# Patient Record
Sex: MALE | Race: WHITE | NOT HISPANIC OR LATINO | Employment: OTHER | URBAN - METROPOLITAN AREA
[De-identification: names, ages, dates, MRNs, and addresses within clinical notes are randomized per-mention and may not be internally consistent; named-entity substitution may affect disease eponyms.]

---

## 2020-04-30 ENCOUNTER — HOSPITAL ENCOUNTER (OUTPATIENT)
Dept: OTHER | Facility: HOSPITAL | Age: 75
Discharge: HOME | End: 2020-04-30
Attending: PHYSICIAN ASSISTANT

## 2020-06-26 ENCOUNTER — TELEPHONE (OUTPATIENT)
Dept: ORTHOPEDIC SURGERY | Facility: CLINIC | Age: 75
End: 2020-06-26

## 2020-06-26 NOTE — TELEPHONE ENCOUNTER
LEFT VOICEMAIL FOR PATIENT THAT HIS APPOINTMENT HAS BEEN RESCHEDULED FROM 7/27/20 @ 2:30 PM TO 7/20/20 @ 2:30 PM DUE TO PROVIDER SCHEDULE CHANGE

## 2020-08-06 ENCOUNTER — OFFICE VISIT (OUTPATIENT)
Dept: ORTHOPEDIC SURGERY | Facility: CLINIC | Age: 75
End: 2020-08-06

## 2020-08-06 VITALS — HEIGHT: 72 IN | TEMPERATURE: 97.7 F | WEIGHT: 229 LBS | BODY MASS INDEX: 31.02 KG/M2

## 2020-08-06 DIAGNOSIS — M75.101 TEAR OF RIGHT ROTATOR CUFF, UNSPECIFIED TEAR EXTENT, UNSPECIFIED WHETHER TRAUMATIC: Primary | ICD-10-CM

## 2020-08-06 PROCEDURE — 99204 OFFICE O/P NEW MOD 45 MIN: CPT | Performed by: ORTHOPAEDIC SURGERY

## 2020-08-06 RX ORDER — ATORVASTATIN CALCIUM 10 MG/1
10 TABLET, FILM COATED ORAL DAILY
COMMUNITY

## 2020-08-06 RX ORDER — IBUPROFEN 200 MG
200 TABLET ORAL NIGHTLY
COMMUNITY

## 2020-08-06 NOTE — PROGRESS NOTES
NEW VISIT    Patient: Samm Valentine  ?  YOB: 1945    MRN: [id number]  ?  Chief Complaint   Patient presents with   • Right Shoulder - Pain      ?  HPI:   Samm Manning presents with complaints of right shoulder pain and discomfort.  He states that he was involved with an overhead type injury.  He finds it weak in abduction.  He finds it difficult to reach into the forward position.  He states that any reaching above his head is associated with pain and discomfort.  He also has some pain with cross body activity.  The pain is worse at night.  He has been through physical therapy and that seems to have helped him tremendously for the better.  He states that he has been able to play golf at this point and has been thinking about surgically getting his rotator cuff tear repaired.  The patient was told by his primary care physician that he did have a rotator cuff tear.      Pain Location: RIGHT shoulder  Radiation: none  Quality: dull, aching, sharp, stabbing  Intensity/Severity: mild-moderate  Duration: 1 year  Onset quality: sudden  Timing: intermittent  Aggravating Factors: overhead reaching, reaching forward  Alleviating Factors: NSAIDs, ice  Previous Episodes: no  Associated Symptoms: pain  ADLs Affected: recreational activities/sports  Previous Treatment: PT to shoulder    This patient is a new patient.  This problem is new to this examiner.      Allergies: No Known Allergies    Medications:   Home Medications:  Current Outpatient Medications on File Prior to Visit   Medication Sig   • atorvastatin (LIPITOR) 10 MG tablet Take 10 mg by mouth Daily.   • ibuprofen (ADVIL,MOTRIN) 200 MG tablet Take 200 mg by mouth Every Night.   • psyllium (METAMUCIL) 58.6 % packet Take 1 packet by mouth Daily.     No current facility-administered medications on file prior to visit.      Current Medications:  Scheduled Meds:  PRN Meds:.    I have reviewed the patient's medical history in detail and updated the  "computerized patient record.  Review and summarization of old records include:    Past Medical History:   Diagnosis Date   • Anxiety    • Depression    • Difficulty sleeping    • High cholesterol    • Numbness and tingling    • Stiffness in joint      Past Surgical History:   Procedure Laterality Date   • HERNIA REPAIR      HERNIA REPAIR  X 3     Social History     Occupational History   • Not on file   Tobacco Use   • Smoking status: Never Smoker   Substance and Sexual Activity   • Alcohol use: Yes     Frequency: 4 or more times a week   • Drug use: Not on file   • Sexual activity: Not on file      History reviewed. No pertinent family history.      Review of Systems   Constitutional: Negative.    HENT: Negative.    Eyes: Negative.    Respiratory: Negative.    Cardiovascular: Negative.    Gastrointestinal: Negative.    Endocrine: Negative.    Genitourinary: Negative.    Musculoskeletal: Positive for arthralgias.   Skin: Negative.    Allergic/Immunologic: Negative.    Neurological: Negative.    Hematological: Negative.    Psychiatric/Behavioral: Negative.           Wt Readings from Last 3 Encounters:   08/06/20 104 kg (229 lb)     Ht Readings from Last 3 Encounters:   08/06/20 181.6 cm (71.5\")     Body mass index is 31.49 kg/m².  Facility age limit for growth percentiles is 20 years.  Vitals:    08/06/20 1448   Temp: 97.7 °F (36.5 °C)         Physical Exam  Constitutional: Patient is oriented to person, place, and time. Appears well-developed and well-nourished.   HENT:   Head: Normocephalic and atraumatic.   Eyes: Conjunctivae and EOM are normal. Pupils are equal, round, and reactive to light.   Cardiovascular: Normal rate, regular rhythm, normal heart sounds and intact distal pulses.   Pulmonary/Chest: Effort normal and breath sounds normal.   Musculoskeletal:   See detailed exam below   Neurological: Alert and oriented to person, place, and time. No sensory deficit. Coordination normal.   Skin: Skin is warm and " dry. Capillary refill takes less than 2 seconds. No rash noted. No erythema.   Psychiatric: Patient has a normal mood and affect. His behavior is normal. Judgment and thought content normal.   Nursing note and vitals reviewed.      Ortho Exam:   Right shoulder (rct). The shoulder is extremely tender anteriorly. There is tenderness over the insertion of the rotator cuff over the greater tuberosity of the humerus. Slight proximal migration of the humeral head is noted. AC joint is tender. Crossover adduction test is positive. Drop arm sign is positive. Forward flexion is 0-100 degrees, abduction is 0-110 degrees, external rotation is 0-30 degrees. Patient has mild atrophy both of the supra and infraspinatus fossa. Sulcus sign is negative. There is no evidence of multidirectional instability. Neer test is positive on compression. Skin and soft tissue tenderness is noted. Patient's strength in abduction and external rotation are extremely lacking. The pain level is 5.        Diagnostics:  Right shoulder xrays ap/lateral views were ordered by David Zaldivar MD and performed at [facility]. These images were independently viewed and interpreted by myself, my impression as follows:  MRI reports are available in the office today.  These images are discussed with the patient.  The patient has some blunting of his glenoid labrum with some degenerative change.  There is a complete tear of the supraspinatus with 2.8 cm of tendon retraction.  Minimal atrophy is noted.  There is mild amounts of subacromial bursitis.  My recommendation is to proceed with shoulder arthroscopy and mini open repair of the rotator cuff are based on the findings of the MRI with clinical correlation.  Reviewed MRI report of right shoulder, summary of impression below:  right Shoulder X-Ray  Indication: Pain and limited ROM  AP and Lateral  Findings: Sclerosis over the greater tuberosity of humerus  no bony lesion  Soft tissues within  normal limits  within normal limits joint spaces  Hardware appropriately positioned not applicable      no prior studies available for comparison.    X-RAY was ordered and reviewed by David Zaldivar MD          Assessment:  Samm was seen today for pain.    Diagnoses and all orders for this visit:    Tear of right rotator cuff, unspecified tear extent, unspecified whether traumatic          Procedures  ?    Plan      · Rest, ice, compression, and elevation (RICE) therapy  · Stretching and strengthening exercises of the shoulder to prevent arthrofibrosis and a frozen shoulder.  · Intra-articular steroid injection discussed with the patient at length.  · Operative treatment discussed with the patient to the extent of 45 minutes of which greater than 50% of my time was spent face-to-face with the patient going over treatment options and potential complications.  Time off work from his regular job as a  was also discussed with the patient at length.  He will call me back and let me know when he wants to proceed with this form of surgical intervention.  · Alternate Ibuprofen and Tylenol as needed  Patient has been diagnosed with a rotator cuff tear.  These tears can range from partial tears to complete tears of the muscle and/or tendon. They can also be categorized in size by width as being small, medium or large.  Diagnosis is confirmed with MRI or CT/arthrogram.  Management of these tears can be conservative with injections, physical therapy, activity modification and use of NSAIDS as needed.  Surgery is the only way to actually fix these tears, as they will not heal or repair on their own.  These tears can usually be repaired with arthroscopic surgery, but occasionally open procedures are necessary.  Many factors can influence the outcomes. First, larger tears have a higher chance of failure from a healing perspective, although pain may improve nonetheless, potentially longer recovery and sometimes,  depending on the quality of the tissue and the length of time the tear has been present, may not be repairable at all.  If the tear has been going on for a long time, the muscle can actually change to fatty tissue, and no longer act as muscle.  This can have a direct effect on not only the ability to repair the tear but also the outcome from the surgery itself both from a healing and functional perspective.  Therefore, the decision to proceed with surgery does have a time factor which can affect the quality of the tissue and reparability but also the potential for the tear to increase in size.  The longer you wait to repair the torn tendon there can be decreased potential for a successful outcome.  · Risks of surgery have been discussed with the patient in detail.  These risks include but are not limited to possibility of infection, DVT, continued pain, continued progression of arthritis, use of allograft tissue, limited range of motion and strength and further surgical intervention.  Patient understands that the surgery will benefit mechanical symptoms of pain and instability but may not help with symptoms of arthritis.      Date of encounter: 08/06/2020   David Zaldivar MD

## 2020-09-18 ENCOUNTER — TELEPHONE (OUTPATIENT)
Dept: ORTHOPEDIC SURGERY | Facility: CLINIC | Age: 75
End: 2020-09-18

## 2020-09-18 NOTE — TELEPHONE ENCOUNTER
PATIENT LEFT VOICEMAIL @ 3:28 PM ON 9/17/20 THAT HE RECEIVED SURGERY INFORMATION AND EXPRESSED UNDERSTANDING

## 2020-10-12 ENCOUNTER — OUTSIDE FACILITY SERVICE (OUTPATIENT)
Dept: ORTHOPEDIC SURGERY | Facility: CLINIC | Age: 75
End: 2020-10-12

## 2020-10-12 PROCEDURE — 23412 REPAIR ROTATOR CUFF CHRONIC: CPT | Performed by: ORTHOPAEDIC SURGERY

## 2020-10-12 PROCEDURE — 29824 SHO ARTHRS SRG DSTL CLAVICLC: CPT | Performed by: ORTHOPAEDIC SURGERY

## 2020-10-16 ENCOUNTER — OFFICE VISIT (OUTPATIENT)
Dept: ORTHOPEDIC SURGERY | Facility: CLINIC | Age: 75
End: 2020-10-16

## 2020-10-16 VITALS — HEIGHT: 71 IN | TEMPERATURE: 96.7 F | WEIGHT: 229 LBS | BODY MASS INDEX: 32.06 KG/M2

## 2020-10-16 DIAGNOSIS — M75.101 TEAR OF RIGHT ROTATOR CUFF, UNSPECIFIED TEAR EXTENT, UNSPECIFIED WHETHER TRAUMATIC: ICD-10-CM

## 2020-10-16 DIAGNOSIS — Z98.890 S/P ROTATOR CUFF SURGERY: Primary | ICD-10-CM

## 2020-10-16 PROCEDURE — 99024 POSTOP FOLLOW-UP VISIT: CPT | Performed by: PHYSICIAN ASSISTANT

## 2020-10-16 RX ORDER — OXYCODONE AND ACETAMINOPHEN 7.5; 325 MG/1; MG/1
TABLET ORAL
COMMUNITY
Start: 2020-10-12

## 2020-10-16 RX ORDER — ATORVASTATIN CALCIUM 20 MG/1
TABLET, FILM COATED ORAL
COMMUNITY
Start: 2020-08-26

## 2020-10-16 NOTE — PROGRESS NOTES
OTHER POST OP GLOBAL     NAME: Samm Valentine  ?  : 1945  ?  MRN: [id number]    Chief Complaint   Patient presents with   • Right Shoulder - Follow-up, Pain   • Post-op     ?  Date of surgery: 10/12/2020    HPI:   Patient returns today for 4 day follow up of right rotator cuff repair. Incision and arthroscopic portals healing nicely/as expected with no signs of infection. Patient reports doing well with no unusual complaints. Appears to be progressing appropriately.  He does report GI upset from pain medication.      Review of Systems:      Ortho Exam:   Right shoulder  · The patient is status-post rotator cuff repair 4 day(s).   · Incision is clean and healing well. Arthroscopic portals are clean.   · Appropriate amounts of swelling and bruising are noted.   · The patients pain is well controlled-he reports not much pain.   · The passive range of motion is abduction 0-30 degrees, flexion 0-30 degrees.   · Axillary nerve function is well preserved.   · Radial artery pulses are palpable.      Diagnostic Studies:  no diagnostic testing performed this visit      Assessment:  Diagnoses and all orders for this visit:    1. Tear of right rotator cuff, unspecified tear extent, unspecified whether traumatic (Primary)  -     Ambulatory Referral to Physical Therapy Evaluate and treat    2. S/P rotator cuff surgery  -     Ambulatory Referral to Physical Therapy Evaluate and treat          Plan   • Incision care  • Continue ice as needed  • Pendulum exercises until 4 weeks postop when he can begin formal physical therapy.  Referral has been given to patient for therapy.  • Pain medication as prescribed for pain control  • Fall precautions  • Follow up in 4 week(s) with Dr. Zaldivar    Date of encounter: 10/16/2020  Brandon Lunsford PA-C    Electronically signed by Brandon Lunsford PA-C, 10/16/20, 8:41 AM EDT.

## 2020-10-16 NOTE — PATIENT INSTRUCTIONS
Rotator Cuff Tear Rehab After Surgery  Ask your health care provider which exercises are safe for you. Do exercises exactly as told by your health care provider and adjust them as directed. It is normal to feel mild stretching, pulling, tightness, or discomfort as you do these exercises. Stop right away if you feel sudden pain or your pain gets worse. Do not begin these exercises until told by your health care provider.  Stretching and range-of-motion exercises  These exercises warm up your muscles and joints and improve the movement and flexibility of your shoulder. These exercises also help to relieve pain.  Shoulder pendulum  In this exercise, you let the injured arm dangle toward the floor and then swing it like a clock pendulum.  1. Stand near a table or counter that you can hold onto for balance.  2. Bend forward at the waist and let your left / right arm hang straight down. Use your other arm to support you and help you stay balanced.  3. Relax your left / right arm and shoulder muscles, and move your hips and your trunk so your left / right arm swings freely. Your arm should swing because of the motion of your body, not because you are using your arm or shoulder muscles.  4. Keep moving your hips and trunk so your arm swings in the following directions, as told by your health care provider:  ? Side to side.  ? Forward and backward.  ? In clockwise and counterclockwise circles.  5. Slowly return to the starting position.  Perform ___5-10 ___ minutes at a time. Complete this exercise __5-6__ times a day.  Document Released: 12/18/2006 Document Revised: 04/10/2020 Document Reviewed: 03/31/2020  Elsevier Patient Education © 2020 [employer]

## 2020-10-26 ENCOUNTER — TELEPHONE (OUTPATIENT)
Dept: ORTHOPEDIC SURGERY | Facility: CLINIC | Age: 75
End: 2020-10-26

## 2020-10-26 NOTE — TELEPHONE ENCOUNTER
PT IS CONCERNED BECAUSE  HIS FEET ARE SWELLING TO THE POINT OF HIS SHOES NOT FITTING CORRECTLY AND HAVING TROUBLE WALKING.     ALSO HE IS EXPERIENCING SOME POPPING IN THE ARM WHEN DOING HIS EXERCISES.    PATIENT HAD RCR  ON 10/12/2020    PLEASE ADVISE

## 2020-10-26 NOTE — TELEPHONE ENCOUNTER
I would recommend the patient to put on a pair of ZENIA hose stockings for his lower extremity swelling.  As far as the shoulder is concerned a slight amount of popping is most likely normal as a follow-up on the rotator cuff tear.  Please call him and let him know this thanks

## 2020-11-12 ENCOUNTER — OFFICE VISIT (OUTPATIENT)
Dept: ORTHOPEDIC SURGERY | Facility: CLINIC | Age: 75
End: 2020-11-12

## 2020-11-12 VITALS — BODY MASS INDEX: 31.15 KG/M2 | WEIGHT: 230 LBS | TEMPERATURE: 98.7 F | HEIGHT: 72 IN

## 2020-11-12 DIAGNOSIS — Z98.890 S/P ROTATOR CUFF SURGERY: Primary | ICD-10-CM

## 2020-11-12 PROCEDURE — 99024 POSTOP FOLLOW-UP VISIT: CPT | Performed by: ORTHOPAEDIC SURGERY

## 2020-11-12 NOTE — PROGRESS NOTES
OTHER POST OP GLOBAL     NAME: Samm Valentine  ?  : 1945  ?  MRN: [id number]    Chief Complaint   Patient presents with   • Right Shoulder - Follow-up, Pain   • Post-op     ?  Date of surgery: 10/12/20    HPI:   Patient returns today for 4 week follow up of right rotator cuff repair. Arthroscopic portals healed nicely with no signs of infection. Patient reports doing well with no unusual complaints. Appears to be progressing appropriately.  He is doing extremely well postoperatively.  Pain control is excellent and physical therapy has helped him to improve the range of motion.  He is still quite weak with abduction and I have reassured him that this would take some time before it fully resolves to her normal function.      Review of Systems:      Ortho Exam:   Right shoulder. The patient is status-post rotator cuff repair 4 week(s) . Incision is clean and healing well. Arthroscopic portals are clean. Appropriate amounts of swelling and bruising are noted. The patients pain is well controlled. The passive range of motion is abduction 0-90 degrees, flexion 0-90 degrees. Axillary nerve function is well preserved. Radial artery pulses are palpable.      Diagnostic Studies:  no diagnostic testing performed this visit      Assessment:  Diagnoses and all orders for this visit:    1. S/P rotator cuff surgery (Primary)          Plan     • Continue ice as needed  • Aggressive ROM  • Stretching and strengthening exercises  • Alternate Ibuprofen and Tylenol as needed  • Fall precautions  • Follow up in 8 week(s)     Date of encounter: 2020  David Zaldivar MD

## 2020-11-12 NOTE — PROGRESS NOTES
POST OP TOTAL GLOBAL      NAME: Samm Valentine           : 1945    MRN: [id number]    Chief Complaint   Patient presents with   • Right Shoulder - Follow-up, Pain   • Post-op       Date of Surgery: 10/12/20  ?  HPI: PO R RCR 10/12/20  Patient returns today for 4 week follow up of {Post op anatomical location:54814} {AS Incision/arthroscopic portals:17889} {Incision Healin}. Patient reports doing well with no unusual complaints. No fevers, rigors or chills. Appears to be progressing appropriately. Patient is on appropriate anticoagulation.       Review of Systems:      Ortho Exam:   {Post Op Total Joint Arthroplasty:26960}      Diagnostic Studies:  {Diagnostics options AS:23920}        Assessment:  There are no diagnoses linked to this encounter.        Plan   · Incision care  · To use ACE wrap/ZEINA stocking  · Continue ice to joint   · Stretching and strengthening exercises  · {AS Expected ROM:89437}  · Falls precautions  · {anticoagulation guidelines post op AS:30452}  · Continue with lifelong antibiotic prophylaxis with dental procedures following total joint replacement.  · Follow up in *** {TIME; DAY/WK/MO/YR(S):89096}    Date of encounter: 2020   Tuyet Madrigal MA

## 2021-01-07 ENCOUNTER — OFFICE VISIT (OUTPATIENT)
Dept: ORTHOPEDIC SURGERY | Facility: CLINIC | Age: 76
End: 2021-01-07

## 2021-01-07 DIAGNOSIS — Z98.890 S/P ROTATOR CUFF SURGERY: ICD-10-CM

## 2021-01-07 DIAGNOSIS — M75.101 TEAR OF RIGHT ROTATOR CUFF, UNSPECIFIED TEAR EXTENT, UNSPECIFIED WHETHER TRAUMATIC: Primary | ICD-10-CM

## 2021-01-07 PROCEDURE — 99024 POSTOP FOLLOW-UP VISIT: CPT | Performed by: ORTHOPAEDIC SURGERY

## 2021-04-08 ENCOUNTER — OFFICE VISIT (OUTPATIENT)
Dept: ORTHOPEDIC SURGERY | Facility: CLINIC | Age: 76
End: 2021-04-08

## 2021-04-08 VITALS — WEIGHT: 236 LBS | BODY MASS INDEX: 31.97 KG/M2 | TEMPERATURE: 98.3 F | HEIGHT: 72 IN

## 2021-04-08 DIAGNOSIS — Z98.890 S/P ROTATOR CUFF SURGERY: Primary | ICD-10-CM

## 2021-04-08 DIAGNOSIS — M75.101 TEAR OF RIGHT ROTATOR CUFF, UNSPECIFIED TEAR EXTENT, UNSPECIFIED WHETHER TRAUMATIC: ICD-10-CM

## 2021-04-08 PROCEDURE — 99212 OFFICE O/P EST SF 10 MIN: CPT | Performed by: ORTHOPAEDIC SURGERY

## 2021-04-08 NOTE — PROGRESS NOTES
"Chief Complaint  Follow-up and Pain of the Right Shoulder    Subjective    History of Present Illness      Samm Valentine is a 75 y.o. male who presents to [facility] for follow-up on rotator cuff repair surgery on the right side.  History of Present Illness this is a patient of mine who has done extremely well following rotator cuff repair surgery.  He had his surgery done by me on 12 October 2020.  Range of motion of the shoulder is improved considerably for the better.  Physical therapy has helped him tremendously to improve the range of motion.  The patient states that he is \"very well pleased with my surgical outcome \".  He states that he has gone back to playing golf and has recovered a full range of motion.  He does not have any nighttime pain or discomfort either.    Pain Location: RIGHT shoulder  Radiation: none  Quality: dull  Intensity/Severity: mild   Duration: 8 months  Progression of symptoms: no worsening, reports improvement  Onset quality: gradual   Timing: intermittent  Aggravating Factors: reaching backward  Alleviating Factors: NSAIDs, rest, ice, stretching/PT/OT, surgery  Previous Episodes: yes  Associated Symptoms: pain  ADLs Affected: recreational activities/sports  Previous Treatment: prior surgery       Objective   Vital Signs:   Temp 98.3 °F (36.8 °C)   Ht 182.9 cm (72.01\")   Wt 107 kg (236 lb)   BMI 32.00 kg/m²     Physical Exam  Physical Exam  Vitals signs and nursing note reviewed.   Constitutional:       Appearance: Normal appearance.   Pulmonary:      Effort: Pulmonary effort is normal.   Skin:     General: Skin is warm and dry.      Capillary Refill: Capillary refill takes less than 2 seconds.   Neurological:      General: No focal deficit present.      Mental Status: He is alert and oriented to person, place, and time. Mental status is at baseline.   Psychiatric:         Mood and Affect: Mood normal.         Behavior: Behavior normal.         Thought " Content: Thought content normal.         Judgment: Judgment normal.     Ortho Exam       Right shoulder. The patient is status-post rotator cuff repair 6 month(s) . Incision is clean and healing well. Arthroscopic portals are clean. Appropriate amounts of swelling and bruising are noted. The patients pain is well controlled. The passive range of motion is abduction 0-140 degrees, flexion 0-140 degrees. Axillary nerve function is well preserved. Radial artery pulses are palpable.        Result Review :   The following data was reviewed by: David Zaldivar MD on 04/08/2021:  Radiologic studies - see below for interpretation  no diagnostic testing performed this visit            Procedures           Assessment   Assessment and Plan    Diagnoses and all orders for this visit:    1. S/P rotator cuff surgery (Primary)    2. Tear of right rotator cuff, unspecified tear extent, unspecified whether traumatic          Follow Up   · Calcium and vitamin D for bone health.  · Rest, ice, compression, and elevation (RICE) therapy  · Stretching and strengthening exercises of the flexors and abductors of the shoulder.  · Reinjury precautions discussed with the patient at length.  · OTC Alternate Ibuprofen and Tylenol as needed  · Follow up as needed   • Patient was given instructions and counseling regarding his condition or for health maintenance advice. Please see specific information pulled into the AVS if appropriate.     David Zaldivar MD   Date of Encounter: 4/8/2021       EMR Dragon/Transcription disclaimer:  Much of this encounter note is an electronic transcription/translation of spoken language to printed text. The electronic translation of spoken language may permit erroneous, or at times, nonsensical words or phrases to be inadvertently transcribed; Although I have reviewed the note for such errors, some may still exist.